# Patient Record
Sex: MALE | Race: WHITE | NOT HISPANIC OR LATINO | Employment: FULL TIME | URBAN - METROPOLITAN AREA
[De-identification: names, ages, dates, MRNs, and addresses within clinical notes are randomized per-mention and may not be internally consistent; named-entity substitution may affect disease eponyms.]

---

## 2022-05-26 ENCOUNTER — OCCUPATIONAL MEDICINE (OUTPATIENT)
Dept: URGENT CARE | Facility: PHYSICIAN GROUP | Age: 32
End: 2022-05-26
Payer: COMMERCIAL

## 2022-05-26 VITALS
HEART RATE: 107 BPM | RESPIRATION RATE: 16 BRPM | DIASTOLIC BLOOD PRESSURE: 70 MMHG | WEIGHT: 205 LBS | TEMPERATURE: 97.8 F | BODY MASS INDEX: 27.77 KG/M2 | HEIGHT: 72 IN | SYSTOLIC BLOOD PRESSURE: 122 MMHG | OXYGEN SATURATION: 100 %

## 2022-05-26 DIAGNOSIS — M25.462 KNEE EFFUSION, LEFT: ICD-10-CM

## 2022-05-26 DIAGNOSIS — S86.911A KNEE STRAIN, RIGHT, INITIAL ENCOUNTER: ICD-10-CM

## 2022-05-26 PROCEDURE — 99203 OFFICE O/P NEW LOW 30 MIN: CPT | Performed by: NURSE PRACTITIONER

## 2022-05-26 NOTE — LETTER
EMPLOYEE’S CLAIM FOR COMPENSATION/ REPORT OF INITIAL TREATMENT  FORM C-4    EMPLOYEE’S CLAIM - PROVIDE ALL INFORMATION REQUESTED   First Name  Forest Last Name  Al Birthdate                    1990                Sex  male Claim Number (Insurer’s Use Only)    Home Address  [address] Age  31 y.o. 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 (if applicable)  Going downstairs under a house    How did this injury or occupational disease occur? (Be specific an answer in detail. Use additional sheet if necessary)  Took a bad step while going downstairs at work and it has progressively gotten worse since. Now it hurts to walk.   If you believe that you have an occupational disease, when did you first have knowledge of the disability and it relationship to your employment?   Witnesses to the Accident  Terrence Reid      Nature of Injury or Occupational Disease  Workers' Compensation  Part(s) of Body Injured or Affected  Knee (R), Knee (R),     I certify that the above is true and correct to the best of my knowledge and that I have provided this information in  order to obtain the benefits of [place]  I hereby authorize any physician, chiropractor, surgeon, practitioner, or other person, any hospital, including [facility] or [facility] hospital, any medical service organization, any insurance company, or other institution or organization to release to each other, any medical or other information, including benefits paid or payable, pertinent to this injury or disease, except information relative to diagnosis, treatment and/or counseling for AIDS, psychological conditions, alcohol or controlled substances, for which I must give specific authorization.  A Photostat of this authorization shall be as valid as the original.     Date   Place Employee’s Original or  *Electronic Signature   THIS REPORT MUST BE COMPLETED AND MAILED WITHIN 3 WORKING DAYS OF TREATMENT   Place  [facility]  Name of Facility  [facility]   Date  5/26/2022 Diagnosis and Description of Injury or Occupational Disease  (M25.462) Knee effusion, left  (S86.911A) Knee strain, right, initial encounter Is there evidence the injured employee was under the influence of alcohol and/or another controlled substance at the time of accident?  ? No ? Yes (if yes, please explain)    Hour  12:10 PM   Diagnoses of Knee effusion, left and Knee strain, right, initial encounter were pertinent to this visit.   Comments:Unable to determine due to the length of time since injury.   Treatment  Work restrictions, knee brace ( hinged), ice packs prn, otc analgesic of choice. Transfer care to occupational medicine. Obtain xray results from [facility].   Have you advised the patient to remain off work five days or     more?    X-Ray Findings    Comments:X-rays were done at [facility] yesterday.  X-rays are not available for review today.  Unknown findings   ? Yes Indicate dates:   From   To     "  From information given by the employee, together with medical evidence, can        you directly connect this injury or occupational disease as job incurred?    Comments:unable to determine ? No If no, is the injured employee capable of:  ? full duty  No ? modified duty  Yes   Is additional medical care by a physician indicated?  Yes If Modified Duty, Specify any Limitations / Restrictions  See [place] D39    Do you know of any previous injury or disease contributing to this condition or occupational disease?  ? Yes ? No (Explain if yes)                          No   Date  5/26/2022 Print Health Care Provider's   CHRISTAL Boles I certify the employer’s copy of  this form was mailed on:   Address  [address] Insurer’s Use Only     [facility] Zip  [address]    Provider’s Tax ID Number  [id number] Telephone  Dept: [phone]             Health Care Provider’s Original or Electronic Signature  n-ApteXCIIGREPOP-DSNNKUGBRE RuddPBIRGIT Degree (MD,DO, DC,PA-C,APRN)   APRN      * Complete and attach Release of Information (Form C-4A) when injured employee signs C-4 Form electronically  ORIGINAL - TREATING HEALTHCARE PROVIDER PAGE 2 - INSURER/TPA PAGE 3 - EMPLOYER PAGE 4 - EMPLOYEE             Form C-4 (rev.08/21)           BRIEF DESCRIPTION OF RIGHTS AND BENEFITS  (Pursuant to [place] 616C.050)    Notice of Injury or Occupational Disease (Incident Report Form C-1): If an injury or occupational disease (OD) arises out of and in the course of employment, you must provide written notice to your employer as soon as practicable, but no later than 7 days after the accident or OD. Your employer shall maintain a sufficient supply of the required forms.    Claim for Compensation (Form C-4): If medical treatment is sought, the form C-4 is available at the place of initial treatment. A completed \"Claim for Compensation\" (Form C-4) must be filed within 90 days after an accident or OD. The treating " physician or chiropractor must, within 3 working days after treatment, complete and mail to the employer, the employer's insurer and third-party , the Claim for Compensation.    Medical Treatment: If you require medical treatment for your on-the-job injury or OD, you may be required to select a physician or chiropractor from a list provided by your workers’ compensation insurer, if it has contracted with an Organization for Managed Care (MCO) or Preferred Provider Organization (PPO) or providers of health care. If your employer has not entered into a contract with an MCO or PPO, you may select a physician or chiropractor from the Panel of Physicians and Chiropractors. Any medical costs related to your industrial injury or OD will be paid by your insurer.    Temporary Total Disability (TTD): If your doctor has certified that you are unable to work for a period of at least 5 consecutive days, or 5 cumulative days in a 20-day period, or places restrictions on you that your employer does not accommodate, you may be entitled to TTD compensation.    Temporary Partial Disability (TPD): If the wage you receive upon reemployment is less than the compensation for TTD to which you are entitled, the insurer may be required to pay you TPD compensation to make up the difference. TPD can only be paid for a maximum of 24 months.    Permanent Partial Disability (PPD): When your medical condition is stable and there is an indication of a PPD as a result of your injury or OD, within 30 days, your insurer must arrange for an evaluation by a rating physician or chiropractor to determine the degree of your PPD. The amount of your PPD award depends on the date of injury, the results of the PPD evaluation, your age and wage.    Permanent Total Disability (PTD): If you are medically certified by a treating physician or chiropractor as permanently and totally disabled and have been granted a PTD status by your insurer, you are  entitled to receive monthly benefits not to exceed 66 2/3% of your average monthly wage. The amount of your PTD payments is subject to reduction if you previously received a lump-sum PPD award.    Vocational Rehabilitation Services: You may be eligible for vocational rehabilitation services if you are unable to return to the job due to a permanent physical impairment or permanent restrictions as a result of your injury or occupational disease.    Transportation and Per Tristin Reimbursement: You may be eligible for travel expenses and per tristin associated with medical treatment.    Reopening: You may be able to reopen your claim if your condition worsens after claim closure.     Appeal Process: If you disagree with a written determination issued by the insurer or the insurer does not respond to your request, you may appeal to the Department of Administration, , by following the instructions contained in your determination letter. You must appeal the determination within 70 days from the date of the determination letter at [address], [address], [place], [place] [address], or [address], [address], [place] [address]. If you disagree with the  decision, you may appeal to the Department of Administration, . You must file your appeal within 30 days from the date of the  decision letter at [address], [address], [place], [place] [address], or [address], [address], [place] [address]. If you disagree with a decision of an , you may file a petition for judicial review with the District Court. You must do so within 30 days of the Appeal Officer’s decision. You may be represented by an  at your own expense or you may contact the [facility] for possible representation.    [place]  for Injured Workers (NAIW): If you disagree with a  decision, you may request that NAIW represent  you without charge at an  Hearing. For information regarding denial of benefits, you may contact the [facility] at: [address], [address], [place] [address], [phone], or [address] [facility], [address], [place] [address], [phone]    To File a Complaint with the Division: If you wish to file a complaint with the  of the Division of Industrial Relations (DIR),  please contact the Workers’ Compensation Section, [address], [address], [place], [place] [address], telephone ([phone]) [phone], or [address], [address], [place], [place] [address], telephone ([phone]) [phone].    For assistance with Workers’ Compensation Issues: You may contact the [facility] Office for Consumer Health Assistance, [address], [address], [place], [place] [address], Toll Free 1-958.749.9185, Web site: http://[facility].nv.gov/Programs/LGENIS E-mail: glenis@[facility].nv.[facility]              __________________________________________________________________                                    _________________            Employee Name / Signature                                                                                                                            Date                                                                                                                                                                                                                              D-2 (rev. 10/20)

## 2022-05-26 NOTE — LETTER
[facility]  [address]  [place] [address]  Phone:  [phone] - Fax:  [phone]   Occ[facility]ational [facility] Progress Report and Disability Certification  Date of Service: 5/26/2022   No Show:  No  Date / Time of Next Visit: 6/3/2022 Transfer care to Occupational Medicine at [facility] office and sports medicine ( [facility])    Claim Information   Patient Name: Forest Melendez  Claim Number:     Employer: [employer] *** Date of Injury: 5/19/2022     Insurer / TPA:   *** ID / SSN:     Occupation: Construction *** Diagnosis: Diagnoses of Knee effusion, left and Knee strain, right, initial encounter were pertinent to this visit.    Medical Information   Related to Industrial Injury? Yes ***   Subjective Complaints:  DOI: 05/19/22. Notified his employer on 05/23/22.   REGIS: he was going downstairs under a house and took a bad step. Felt pain in right knee. He continued to work since that time. Pain slowly but progressively has gotten worse. Painful to walk. Pain 5/10 right  now. At night pain is 10/10.   Treatment tried: ice, heat and Ibuprofen ( 400 mg TID) .   He was seen yesterday at [facility] urgent care for this injury. He used his own insurance yesterday because he wanted to be seen yesterday and he was told that they did not do workers compensation healthcare. He had xrays yesterday of his  right knee. He was told to use ice and anti-inflammatories. Was told that his xray was normal -- then  today he received a phone call and was told that he needed to go to orthopedic and have MRI due to effusion in his knee that was later seen on xray. A referral was placed but he could not be seen there today due to workers compensation injury.   No other employers. No outside work sport activities. Does not have stairs at home that he needs to climb. Denies previous injury to knee.    Objective Findings: Physical Exam  Vitals and nursing note reviewed.   Constitutional:        Appearance: He is well-developed.   Cardiovascular:      Rate and Rhythm: Normal rate.      Pulses: Normal pulses.   Pulmonary:      Effort: Pulmonary effort is normal.   Musculoskeletal:      Right knee: Swelling and effusion present. No erythema, lacerations or crepitus. Decreased range of motion. Tenderness present over the MCL. Normal alignment.      Instability Tests: Anterior drawer test negative. Posterior drawer test negative.      Comments: Right knee assessment limited by swelling and generalized tightness in joint.   Pain increased with flexion and extension    Skin:     General: Skin is warm and dry.   Neurological:      Mental Status: He is alert and oriented to person, place, and time.      Cranial Nerves: No cranial nerve deficit.      Coordination: Coordination normal.      Gait: Gait abnormal.      Deep Tendon Reflexes: Reflexes are normal and symmetric.   Psychiatric:         Speech: Speech normal.        Pre-Existing Condition(s):     Assessment:   Initial Visit    Status: Discharged / Care Transfer  Comments:Transfer care to occupational medicine.  Permanent Disability:No    Plan: Medication    Diagnostics:      Comments:       Disability Information   Status: Released to Restricted Duty    From:  5/26/2022  Through: 6/3/2022 Restrictions are: Temporary   Physical Restrictions   Sitting:    Standing:    Stooping:    Bending:      Squatting:    Walking:    Climbing:    Pushing:      Pulling:    Other:    Reaching Above Shoulder (L):   Reaching Above Shoulder (R):       Reaching Below Shoulder (L):    Reaching Below Shoulder (R):      Not to exceed Weight Limits   Carrying(hrs):   Weight Limit(lb):   Lifting(hrs):   Weight  Limit(lb):     Comments:      Repetitive Actions   Hands: i.e. Fine Manipulations from Grasping:     Feet: i.e. Operating Foot Controls:     Driving / Operate Machinery:     Health Care Provider’s Original or Electronic Signature  CHRISTAL Boles [facility]  Provider’s Original or Electronic Signature    Forest Allen MD         Clinic Name / Location: [address] [place] [address] Clinic Phone Number: Dept: [phone]   Appointment Time: 11:15 Am Visit Start Time: 12:10 PM   Check-In Time:  11:35 Am Visit Discharge Time:  ***   Original-Treating Physician or Chiropractor    Page 2-Insurer/TPA    Page 3-Employer    Page 4-Employee

## 2022-05-26 NOTE — PROGRESS NOTES
Subjective     Forest Melendez is a 31 y.o. male who presents with Knee Injury (Took a wrong step and caugh himself, x7days. )    Reviewed past medical, surgical and family history. Reviewed prescription and OTC medications with patient in electronic health record today  Allergies: Patient has no known allergies.          DOI: 05/19/22. Notified his employer on 05/23/22.   REIGS: he was going downstairs under a house and took a bad step. Felt pain in right knee. He continued to work. Pain slowly but progressively got worse. Painful to walk. Pain 5/10 right  now. At night pain is 10/10.   Treatment tried: ice, heat and Ibuprofen ( 400 mg TID) .   He was seen yesterday at [facility] for this injury. He used his own insurance yesterday because he wanted to be seen and they did not do workers compensation healthcare. They did xrays yesterday of his knee. He was told to use ice and anti-inflammatories. Was told that his xray was normal then was called today and told that he needed to go to orthopedic due to effusion in his knee.      HPI    ROS           Objective     /70   Pulse (!) 107   Temp 36.6 °C (97.8 °F) (Tympanic)   Resp 16   Ht 1.829 m (6')   Wt 93 kg (205 lb)   SpO2 100%   BMI 27.80 kg/m²      Physical Exam  Vitals and nursing note reviewed.   Constitutional:       Appearance: He is well-developed.   Cardiovascular:      Rate and Rhythm: Normal rate.      Pulses: Normal pulses.   Pulmonary:      Effort: Pulmonary effort is normal.   Musculoskeletal:      Right knee: Swelling and effusion present. No erythema, lacerations or crepitus. Decreased range of motion. Tenderness present over the MCL. Normal alignment.      Instability Tests: Anterior drawer test negative. Posterior drawer test negative.      Comments: Right knee assessment limited by swelling and generalized tightness in joint.   Pain increased with flexion and extension    Skin:     General: Skin is warm and dry.    Neurological:      Mental Status: He is alert and oriented to person, place, and time.      Cranial Nerves: No cranial nerve deficit.      Coordination: Coordination normal.      Gait: Gait abnormal.      Deep Tendon Reflexes: Reflexes are normal and symmetric.   Psychiatric:         Speech: Speech normal.                             Assessment & Plan        1. Knee effusion, left  Referral to Occupational Medicine    Referral to Sports Medicine   2. Knee strain, right, initial encounter  Referral to Occupational Medicine    Referral to Sports Medicine       See [place] D39 and C4               I have spent  30 minutes on the care of this patient.  This includes preparing for visit which includes review of previous visits if available in EMR, obtaining HPI, exam and evaluation of patient, ordering and independent interpretation of labs, imaging, tests, medical management, counseling, education and documentation.

## 2022-05-26 NOTE — LETTER
[facility]  [address] [facility] [address] [facility] [place] [address]  Phone:  [phone] - Fax:  [phone]   Occupational Health Network Progress Report and Disability Certification  Date of Service: 5/26/2022   No Show:  No  Date / Time of Next Visit: 6/3/2022   Claim Information   Patient Name: Forest Melendez  Claim Number:     Employer: [employer]  Date of Injury: 5/19/2022     Insurer / TPA:    ID / SSN:     Occupation: Construction  Diagnosis: Diagnoses of Knee effusion, left and Knee strain, right, initial encounter were pertinent to this visit.    Medical Information   Related to Industrial Injury? Yes    Subjective Complaints:  DOI: 05/19/22. Notified his employer on 05/23/22.   REGIS: he was going downstairs under a house and took a bad step. Felt pain in right knee. He continued to work since that time. Pain slowly but progressively has gotten worse. Painful to walk. Pain 5/10 right  now. At night pain is 10/10.   Treatment tried: ice, heat and Ibuprofen ( 400 mg TID) .   He was seen yesterday at [facility] urgent care for this injury. He used his own insurance yesterday because he wanted to be seen yesterday and he was told that they did not do workers compensation healthcare. He had xrays yesterday of his  right knee. He was told to use ice and anti-inflammatories. Was told that his xray was normal -- then  today he received a phone call and was told that he needed to go to orthopedic and have MRI due to effusion in his knee that was later seen on xray. A referral was placed but he could not be seen there today due to workers compensation injury.   No other employers. No outside work sport activities. Does not have stairs at home that he needs to climb. Denies previous injury to knee.    Objective Findings: Physical Exam  Vitals and nursing note reviewed.   Constitutional:       Appearance: He is well-developed.   Cardiovascular:      Rate and Rhythm: Normal rate.      Pulses:  Normal pulses.   Pulmonary:      Effort: Pulmonary effort is normal.   Musculoskeletal:      Right knee: Swelling and effusion present. No erythema, lacerations or crepitus. Decreased range of motion. Tenderness present over the MCL. Normal alignment.      Instability Tests: Anterior drawer test negative. Posterior drawer test negative.      Comments: Right knee assessment limited by swelling and generalized tightness in joint.   Pain increased with flexion and extension    Skin:     General: Skin is warm and dry.   Neurological:      Mental Status: He is alert and oriented to person, place, and time.      Cranial Nerves: No cranial nerve deficit.      Coordination: Coordination normal.      Gait: Gait abnormal.      Deep Tendon Reflexes: Reflexes are normal and symmetric.   Psychiatric:         Speech: Speech normal.        Pre-Existing Condition(s): Denies   Assessment:   Initial Visit    Status: Discharged / Care Transfer  Comments:Transfer care to occupational medicine.  Permanent Disability:No    Plan: Medication    Diagnostics:   Comments:Were done yesterday at [facility].  X-rays are unavailable today for review.    Comments:       Disability Information   Status: Released to Restricted Duty    From:  2022  Through: 6/3/2022 Restrictions are: Temporary   Physical Restrictions   Sitting:    Standing:  < or = to 2 hrs/day Stoopin hrs/day Bending:      Squattin hrs/day Walking:  < or = to 2 hrs/day Climbin hrs/day Pushing:      Pulling:    Other:    Reaching Above Shoulder (L):   Reaching Above Shoulder (R):       Reaching Below Shoulder (L):    Reaching Below Shoulder (R):      Not to exceed Weight Limits   Carrying(hrs):   Weight Limit(lb):   Lifting(hrs):   Weight  Limit(lb):     Comments: Hinged knee brace while at work and when walking.  He may remove it to drive since it is his right knee.    Repetitive Actions   Hands: i.e. Fine Manipulations from Grasping:     Feet:  i.e. Operating Foot Controls: < or = to 2 hrs/day   Driving / Operate Machinery: 0 hrs/day   Health Care Provider’s Original or Electronic Signature  CHRISTAL Boles Health Care Provider’s Original or Electronic Signature    Forest Allen MD         Clinic Name / Location: [address] [address] Clinic Phone Number: Dept: [phone]   Appointment Time: 11:15 Am Visit Start Time: 12:10 PM   Check-In Time:  11:35 Am Visit Discharge Time:  1:10 PM   Original-Treating Physician or Chiropractor    Page 2-Insurer/TPA    Page 3-Employer    Page 4-Employee

## 2022-06-03 ENCOUNTER — OCCUPATIONAL MEDICINE (OUTPATIENT)
Dept: OCCUPATIONAL MEDICINE | Facility: CLINIC | Age: 32
End: 2022-06-03
Payer: COMMERCIAL

## 2022-06-03 VITALS
OXYGEN SATURATION: 94 % | DIASTOLIC BLOOD PRESSURE: 100 MMHG | WEIGHT: 208 LBS | HEIGHT: 72 IN | BODY MASS INDEX: 28.17 KG/M2 | TEMPERATURE: 97.2 F | SYSTOLIC BLOOD PRESSURE: 140 MMHG | HEART RATE: 139 BPM

## 2022-06-03 DIAGNOSIS — S83.91XD SPRAIN OF RIGHT KNEE, UNSPECIFIED LIGAMENT, SUBSEQUENT ENCOUNTER: ICD-10-CM

## 2022-06-03 PROCEDURE — 99204 OFFICE O/P NEW MOD 45 MIN: CPT | Performed by: PREVENTIVE MEDICINE

## 2022-06-03 NOTE — LETTER
[facility]  [address],   [address] [place] [address]  Phone:  [phone] - Fax:  [phone]   Occupational Health [facility] Progress Report and Disability Certification  Date of Service: 6/3/2022   No Show:  No  Date / Time of Next Visit: 6/24/2022 @ 10:45AM   Claim Information   Patient Name: Forest Melendez  Claim Number:     Employer:   [employer] Date of Injury: 5/19/2022     Insurer / TPA: [employer]  ID / SSN:     Occupation: Construction  Diagnosis: There were no encounter diagnoses.    Medical Information   Related to Industrial Injury? 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 REGIS: he was going downstairs under a house and took a bad step. Felt pain in right knee.  Patient states that pain was mild at first and worsened over few days.  The knee does start to get swollen as well.  He sought care at [facility], he try to go to orthopedic clinic but he was told to file work comp claim.  Then presented to [facility] and was referred over here.  Patient states that pain is most on the medial lateral aspects of the knee.  He does get some instability with walking especially without the knee brace.  Taking Tylenol, elevating and icing knee as able.  He states that there were 3 to 4 days where the knee was very swollen but that is since calmed down.  Patient working light duty.  Denies prior right knee injuries.   Objective Findings: Right knee: Mild to moderate diffuse swelling of the knee.  Mild tenderness over the medial and lateral joint lines.  Anterior/posterior drawer test negative.  No laxity with varus or valgus stress but does elicit some pain.  Paloma's positive.  Able to squat fully with mild discomfort.  Slight antalgic gait.   Pre-Existing Condition(s):     Assessment:   Condition Same    Status: Additional Care Required  Permanent Disability:No    Plan:      Diagnostics:       Comments:  Some concern for possible meniscus injury, placed referral for MRI right knee  Placed referral for physical therapy  OTC ibuprofen/Tylenol as needed  Elevate and ice knee as needed  Continue knee brace for prolonged walking or going up and down stairs  Restricted duty  Follow-up 3 weeks, sooner if MRI performed sooner    Disability Information   Status: Released to Restricted Duty    From:  6/3/2022  Through: 6/24/2022 Restrictions are: Temporary   Physical Restrictions   Sitting:    Standing:  < or = to 2 hrs/day Stooping:    Bending:      Squatting:  < or = to 1 hr/day Walking:  < or = to 2 hrs/day Climbing:  < or = to 1 hr/day Pushing:      Pulling:    Other:    Reaching Above Shoulder (L):   Reaching Above Shoulder (R):       Reaching Below Shoulder (L):    Reaching Below Shoulder (R):      Not to exceed Weight Limits   Carrying(hrs):   Weight Limit(lb): < or = to 25 pounds Lifting(hrs):   Weight  Limit(lb): < or = to 25 pounds   Comments:      Repetitive Actions   Hands: i.e. Fine Manipulations from Grasping:     Feet: i.e. Operating Foot Controls:     Driving / Operate Machinery:     Health Care Provider’s Original or Electronic Signature  Jono Corona D.O. Health Care Provider’s Original or Electronic Signature    Forest Allen MD         Clinic Name / Location: [address] [place] [address] Clinic Phone Number: Dept: [phone]   Appointment Time: 1:45 Pm Visit Start Time: 1:32 PM   Check-In Time:  1:29 Pm Visit Discharge Time:  2:07PM   Original-Treating Physician or Chiropractor    Page 2-Insurer/TPA    Page 3-Employer    Page 4-Employee

## 2022-06-03 NOTE — PROGRESS NOTES
Subjective:     Forest Melendez is a 31 y.o. 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 REGIS: he was going downstairs under a house and took a bad step. Felt pain in right knee.  Patient states that pain was mild at first and worsened over few days.  The knee does start to get swollen as well.  He sought care at [place] [facility], he try to go to orthopedic clinic but he was told to file work comp claim.  Then presented to [facility] and was referred over here.  Patient states that pain is most on the medial lateral aspects of the knee.  He does get some instability with walking especially without the knee brace.  Taking Tylenol, elevating and icing knee as able.  He states that there were 3 to 4 days where the knee was very swollen but that is since calmed down.  Patient working light duty.  Denies prior right knee injuries.    ROS: All systems were reviewed on intake form, form was reviewed and signed. See scanned documents in media. Pertinent positives and negatives included in HPI.    PMH: No pertinent past medical history to this problem  MEDS: Medications were reviewed in Epic  ALLERGIES:   Allergies   Allergen Reactions   • Bee      anaphalactic       SOCHX: Works as a supervisor at [employer]  FH: No pertinent family history to this problem       Objective:     BP (!) 140/100   Pulse (!) 139   Temp 36.2 °C (97.2 °F)   Ht 1.829 m (6')   Wt 94.3 kg (208 lb)   SpO2 94%   BMI 28.21 kg/m²     Constitutional: Patient is in no acute distress. Appears well-developed and well-nourished.   HENT: Normocephalic and atraumatic. EOM are normal. No scleral icterus.   Cardiovascular: Normal rate.    Pulmonary/Chest: Effort normal. No respiratory distress.   Neurological: Patient is alert and oriented to person, place, and time.   Skin: Skin is warm and dry.   Psychiatric: Normal mood and affect. Behavior is  normal.     Right knee: Mild to moderate diffuse swelling of the knee.  Mild tenderness over the medial and lateral joint lines.  Anterior/posterior drawer test negative.  No laxity with varus or valgus stress but does elicit some pain.  Paloma's positive.  Able to squat fully with mild discomfort.  Slight antalgic gait.    Assessment/Plan:       1. Sprain of right knee, unspecified ligament, subsequent encounter  - Referral to Radiology  - MR-KNEE-W/O RIGHT; Future  - Referral to Physical Therapy    Released to Restricted Duty FROM 6/3/2022 TO 6/24/2022     Some concern for possible meniscus injury, placed referral for MRI right knee  Placed referral for physical therapy  OTC ibuprofen/Tylenol as needed  Elevate and ice knee as needed  Continue knee brace for prolonged walking or going up and down stairs  Restricted duty  Follow-up 3 weeks, sooner if MRI performed sooner    Differential diagnosis, natural history, supportive care, and indications for immediate follow-up discussed.    Approximately 45 minutes were spent in reviewing notes, preparing for visit, obtaining history, exam and evaluation, patient counseling/education and post visit documentation/orders.

## 2022-06-24 ENCOUNTER — OCCUPATIONAL MEDICINE (OUTPATIENT)
Dept: OCCUPATIONAL MEDICINE | Facility: CLINIC | Age: 32
End: 2022-06-24
Payer: COMMERCIAL

## 2022-06-24 VITALS
RESPIRATION RATE: 14 BRPM | SYSTOLIC BLOOD PRESSURE: 120 MMHG | BODY MASS INDEX: 28.17 KG/M2 | WEIGHT: 208 LBS | HEIGHT: 72 IN | HEART RATE: 104 BPM | OXYGEN SATURATION: 98 % | TEMPERATURE: 98.4 F | DIASTOLIC BLOOD PRESSURE: 80 MMHG

## 2022-06-24 DIAGNOSIS — S83.91XD SPRAIN OF RIGHT KNEE, UNSPECIFIED LIGAMENT, SUBSEQUENT ENCOUNTER: ICD-10-CM

## 2022-06-24 PROCEDURE — 99213 OFFICE O/P EST LOW 20 MIN: CPT | Performed by: PREVENTIVE MEDICINE

## 2022-06-24 ASSESSMENT — ENCOUNTER SYMPTOMS
SENSORY CHANGE: 0
TINGLING: 0

## 2022-06-24 NOTE — LETTER
[address],   [address] [place] [address]  Phone:  [phone] - Fax:  [phone]   Occupational Health [facility] Progress Report and Disability Certification  Date of Service: 6/24/2022   No Show:  No  Date / Time of Next Visit: 7/15/2022 1030am   Claim Information   Patient Name: Forest Melendez  Claim Number:     Employer:   [employer] Date of Injury: 5/19/2022     Insurer / TPA:   [employer] ID / SSN:     Occupation: Construction  Diagnosis: The encounter diagnosis was Sprain of right knee, unspecified ligament, subsequent encounter.    Medical Information   Related to Industrial Injury? 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 REGIS: he was going downstairs under a house and took a bad step. Felt pain in right knee.  Patient states that pain was mild at first and worsened over few days.  Patient states that pain is mostly unchanged from last visit.  Pain mostly on the sides.  He is able to work light duty still.  He states he has not received any paperwork or phone calls from the work comp insurance.  He is unsure of the status of his claim.   Objective Findings: Right knee: Mild  diffuse swelling of the knee.  Area of pain over the medial and lateral knee.  Slight antalgic gait.   Pre-Existing Condition(s):     Assessment:   Condition Same    Status: Additional Care Required  Permanent Disability:No    Plan:      Diagnostics:      Comments:  Referral for MRI right knee, pending approval  Referral for physical therapy, pending approval  OTC ibuprofen/Tylenol as needed  Elevate and ice knee as needed   recommend trying to contact work comp insurance or have employer also trying contact work comp insurance  Continue knee brace for prolonged walking or going up and down stairs  Restricted duty  Follow-up 3 weeks, sooner if MRI performed sooner    Disability Information   Status: Released to Restricted Duty     From:  6/24/2022  Through: 7/15/2022 Restrictions are: Temporary   Physical Restrictions   Sitting:    Standing:  < or = to 2 hrs/day Stooping:    Bending:      Squatting:  < or = to 1 hr/day Walking:  < or = to 2 hrs/day Climbing:  < or = to 1 hr/day Pushing:      Pulling:    Other:    Reaching Above Shoulder (L):   Reaching Above Shoulder (R):       Reaching Below Shoulder (L):    Reaching Below Shoulder (R):      Not to exceed Weight Limits   Carrying(hrs):   Weight Limit(lb): < or = to 25 pounds Lifting(hrs):   Weight  Limit(lb): < or = to 25 pounds   Comments:      Repetitive Actions   Hands: i.e. Fine Manipulations from Grasping:     Feet: i.e. Operating Foot Controls:     Driving / Operate Machinery:     Health Care Provider’s Original or Electronic Signature  Jono Corona D.O. Health Care Provider’s Original or Electronic Signature    Forest Allen MD         Clinic Name / Location: [address] [address] Clinic Phone Number: Dept: [phone]   Appointment Time: 10:45 Am Visit Start Time: 10:39 AM   Check-In Time:  10:37 Am Visit Discharge Time:  1056am   Original-Treating Physician or Chiropractor    Page 2-Insurer/TPA    Page 3-Employer    Page 4-Employee

## 2022-06-24 NOTE — PROGRESS NOTES
Subjective:     Forest Melendez is a 31 y.o. 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 REGIS: he was going downstairs under a house and took a bad step. Felt pain in right knee.  Patient states that pain was mild at first and worsened over few days.  Patient states that pain is mostly unchanged from last visit.  Pain mostly on the sides.  He is able to work light duty still.  He states he has not received any paperwork or phone calls from the work comp insurance.  He is unsure of the status of his claim.    Review of Systems   Neurological: Negative for tingling and sensory change.       SOCHX: Works as a  at [employer]  FH: No pertinent family history to this problem.       Objective:     /80   Pulse (!) 104   Temp 36.9 °C (98.4 °F) (Temporal)   Resp 14   Ht 1.829 m (6')   Wt 94.3 kg (208 lb)   SpO2 98%   BMI 28.21 kg/m²     Constitutional: Patient is in no acute distress. Appears well-developed and well-nourished.   Cardiovascular: Normal rate.    Pulmonary/Chest: Effort normal. No respiratory distress.   Neurological: Patient is alert and oriented to person, place, and time.   Skin: Skin is warm and dry.   Psychiatric: Normal mood and affect. Behavior is normal.     Right knee: Mild  diffuse swelling of the knee.  Area of pain over the medial and lateral knee.  Slight antalgic gait.    Assessment/Plan:       1. Sprain of right knee, unspecified ligament, subsequent encounter    Released to Restricted Duty FROM 6/24/2022 TO 7/15/2022       Referral for MRI right knee, pending approval  Referral for physical therapy, pending approval  OTC ibuprofen/Tylenol as needed  Elevate and ice knee as needed   recommend trying to contact work comp insurance or have employer also trying contact work comp insurance  Continue knee brace for prolonged walking or going up and  down stairs  Restricted duty  Follow-up 3 weeks, sooner if MRI performed sooner    Differential diagnosis, natural history, supportive care, and indications for immediate follow-up discussed.    Approximately 25 minutes was spent in preparing for visit, obtaining history, exam and evaluation, patient counseling/education and post visit documentation/orders.

## 2022-07-15 ENCOUNTER — OCCUPATIONAL MEDICINE (OUTPATIENT)
Dept: OCCUPATIONAL MEDICINE | Facility: CLINIC | Age: 32
End: 2022-07-15
Payer: COMMERCIAL

## 2022-07-15 VITALS
DIASTOLIC BLOOD PRESSURE: 74 MMHG | HEIGHT: 73 IN | RESPIRATION RATE: 16 BRPM | WEIGHT: 201.2 LBS | BODY MASS INDEX: 26.66 KG/M2 | OXYGEN SATURATION: 97 % | SYSTOLIC BLOOD PRESSURE: 132 MMHG | HEART RATE: 115 BPM | TEMPERATURE: 98.7 F

## 2022-07-15 DIAGNOSIS — S83.91XD SPRAIN OF RIGHT KNEE, UNSPECIFIED LIGAMENT, SUBSEQUENT ENCOUNTER: ICD-10-CM

## 2022-07-15 PROCEDURE — 99213 OFFICE O/P EST LOW 20 MIN: CPT | Performed by: PREVENTIVE MEDICINE

## 2022-07-15 NOTE — PROGRESS NOTES
"Subjective:     Forest Melendez is a 31 y.o. 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 REGIS: he was going downstairs under a house and took a bad step. Felt pain in right knee.  Patient states overall he feels that the knee pain has been worsening over the last few weeks.  Has more pain on the lateral and medial aspect of the knee.  Patient states pain especially worse with going up and down stairs.  He states a few days ago he was able to talk with his case  and received a letter from his case  asking for records and referrals that we have done thus far.    ROS  SOCHX: Works as a  at [employer]  FH: No pertinent family history to this problem.         Objective:     /74 (BP Location: Right arm, Patient Position: Sitting, BP Cuff Size: Adult)   Pulse (!) 115   Temp 37.1 °C (98.7 °F) (Temporal)   Resp 16   Ht 1.854 m (6' 1\")   Wt 91.3 kg (201 lb 3.2 oz)   SpO2 97%   BMI 26.55 kg/m²     Constitutional: Patient is in no acute distress. Appears well-developed and well-nourished.   Cardiovascular: Normal rate.    Pulmonary/Chest: Effort normal. No respiratory distress.   Neurological: Patient is alert and oriented to person, place, and time.   Skin: Skin is warm and dry.   Psychiatric: Normal mood and affect. Behavior is normal.     Right knee: No gross deformity.  Area of pain over the medial and lateral joint lines.  Slight antalgic gait.  Drawer test negative.    Assessment/Plan:       1. Sprain of right knee, unspecified ligament, subsequent encounter  - Referral to Orthopedics    Released to Restricted Duty FROM 7/15/2022 TO 8/16/2022       Given duration of symptoms placed new referral to orthopedics  Referral for MRI right knee, pending approval  Referral for physical therapy, pending approval  We will resubmit all referrals to case   OTC ibuprofen/Tylenol as " needed  Elevate and ice knee as needed   Continue knee brace for prolonged walking or going up and down stairs  Restricted duty  Follow-up 4 weeks if not seen by orthopedics    Differential diagnosis, natural history, supportive care, and indications for immediate follow-up discussed.    Approximately 25 minutes was spent in preparing for visit, obtaining history, exam and evaluation, patient counseling/education and post visit documentation/orders.

## 2022-07-15 NOTE — LETTER
[address],   [address] [place] [address]  Phone:  [phone] - Fax:  [phone]   Occupational Health [facility] Progress Report and Disability Certification  Date of Service: 7/15/2022   No Show:  No  Date / Time of Next Visit: 8/16/2022 @ 7:45am   Claim Information   Patient Name: Forest Melednez  Claim Number:     Employer:   [employer] Date of Injury: 5/19/2022     Insurer / TPA: [employer]  ID / SSN:     Occupation: Construction  Diagnosis: The encounter diagnosis was Sprain of right knee, unspecified ligament, subsequent encounter.    Medical Information   Related to Industrial Injury? 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 REGIS: he was going downstairs under a house and took a bad step. Felt pain in right knee.  Patient states overall he feels that the knee pain has been worsening over the last few weeks.  Has more pain on the lateral and medial aspect of the knee.  Patient states pain especially worse with going up and down stairs.  He states a few days ago he was able to talk with his case  and received a letter from his case  asking for records and referrals that we have done thus far.   Objective Findings: Right knee: No gross deformity.  Area of pain over the medial and lateral joint lines.  Slight antalgic gait.  Drawer test negative.   Pre-Existing Condition(s):     Assessment:   Condition Same    Status: Additional Care Required  Permanent Disability:No    Plan:      Diagnostics:      Comments:  Given duration of symptoms placed new referral to orthopedics  Referral for MRI right knee, pending approval  Referral for physical therapy, pending approval  We will resubmit all referrals to case   OTC ibuprofen/Tylenol as needed  Elevate and ice knee as needed   Continue knee brace for prolonged walking or going up and down stairs  Restricted  duty  Follow-up 4 weeks if not seen by orthopedics    Disability Information   Status: Released to Restricted Duty    From:  7/15/2022  Through: 8/16/2022 Restrictions are: Temporary   Physical Restrictions   Sitting:    Standing:  < or = to 2 hrs/day Stooping:    Bending:      Squatting:  < or = to 1 hr/day Walking:  < or = to 2 hrs/day Climbing:  < or = to 1 hr/day Pushing:      Pulling:    Other:    Reaching Above Shoulder (L):   Reaching Above Shoulder (R):       Reaching Below Shoulder (L):    Reaching Below Shoulder (R):      Not to exceed Weight Limits   Carrying(hrs):   Weight Limit(lb): < or = to 25 pounds Lifting(hrs):   Weight  Limit(lb): < or = to 25 pounds   Comments:      Repetitive Actions   Hands: i.e. Fine Manipulations from Grasping:     Feet: i.e. Operating Foot Controls:     Driving / Operate Machinery:     Health Care Provider’s Original or Electronic Signature  Jono Corona D.O. Health Care Provider’s Original or Electronic Signature    Forest Allen MD         Clinic Name / Location: [address],   [address] [address] Clinic Phone Number: Dept: [phone]   Appointment Time: 10:30 Am Visit Start Time: 10:51 AM   Check-In Time:  10:44 Am Visit Discharge Time:  11:15am   Original-Treating Physician or Chiropractor    Page 2-Insurer/TPA    Page 3-Employer    Page 4-Employee

## 2022-08-16 ENCOUNTER — OCCUPATIONAL MEDICINE (OUTPATIENT)
Dept: OCCUPATIONAL MEDICINE | Facility: CLINIC | Age: 32
End: 2022-08-16
Payer: COMMERCIAL

## 2022-08-16 VITALS
BODY MASS INDEX: 27.55 KG/M2 | TEMPERATURE: 99.3 F | DIASTOLIC BLOOD PRESSURE: 62 MMHG | HEIGHT: 72 IN | HEART RATE: 102 BPM | RESPIRATION RATE: 16 BRPM | OXYGEN SATURATION: 98 % | SYSTOLIC BLOOD PRESSURE: 128 MMHG | WEIGHT: 203.4 LBS

## 2022-08-16 DIAGNOSIS — S83.91XD SPRAIN OF RIGHT KNEE, UNSPECIFIED LIGAMENT, SUBSEQUENT ENCOUNTER: ICD-10-CM

## 2022-08-16 PROCEDURE — 99213 OFFICE O/P EST LOW 20 MIN: CPT | Performed by: PREVENTIVE MEDICINE

## 2022-08-16 NOTE — LETTER
[address],   [address] [place] [address]  Phone:  [phone] - Fax:  [phone]   Occupational Health [facility] Progress Report and Disability Certification  Date of Service: 8/16/2022   No Show:  No  Date / Time of Next Visit: 9/15/2022 @745am   Claim Information   Patient Name: Forest Melendez  Claim Number:     Employer:   [employer] Date of Injury: 5/19/2022     Insurer / TPA: [employer]  ID / SSN:     Occupation: Construction  Diagnosis: The encounter diagnosis was Sprain of right knee, unspecified ligament, subsequent encounter.    Medical Information   Related to Industrial Injury? 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 REGIS: he was going downstairs under a house and took a bad step. Felt pain in right knee.  Patient states overall knee pain is about the same.  Has not had any change in symptoms.  He states he has been trying to contact work comp insurance has not had any success.  Has not heard anything about PT, MRI or orthopedic consult being approved.   Objective Findings: Right knee: No gross deformity.  Area of pain over the medial and lateral joint lines.  Slight antalgic gait.    Pre-Existing Condition(s):     Assessment:   Condition Same    Status: Additional Care Required  Permanent Disability:No    Plan:      Diagnostics:      Comments:  Referral to orthopedics, pending approval for the past month  Referral for MRI right knee, pending approval for the past 2 and half months  Referral for physical therapy, pending approval for the past 2 and half months  OTC ibuprofen/Tylenol as needed  Elevate and ice knee as needed   Continue knee brace for prolonged walking or going up and down stairs  Restricted duty  Follow-up 4 weeks if not seen by orthopedics    Disability Information   Status: Released to Restricted Duty    From:  8/16/2022  Through: 9/15/2022 Restrictions are:      Physical Restrictions   Sitting:    Standing:  < or = to 2 hrs/day Stooping:    Bending:      Squatting:  < or = to 1 hr/day Walking:  < or = to 2 hrs/day Climbing:  < or = to 1 hr/day Pushing:      Pulling:    Other:    Reaching Above Shoulder (L):   Reaching Above Shoulder (R):       Reaching Below Shoulder (L):    Reaching Below Shoulder (R):      Not to exceed Weight Limits   Carrying(hrs):   Weight Limit(lb): < or = to 25 pounds Lifting(hrs):   Weight  Limit(lb): < or = to 25 pounds   Comments:      Repetitive Actions   Hands: i.e. Fine Manipulations from Grasping:     Feet: i.e. Operating Foot Controls:     Driving / Operate Machinery:     Health Care Provider’s Original or Electronic Signature  Jono Corona D.O. Health Care Provider’s Original or Electronic Signature    Forest Allen MD         Clinic Name / Location: [address] [address] Clinic Phone Number: Dept: [phone]   Appointment Time: 7:45 Am Visit Start Time: 8:18 AM   Check-In Time:  8:13 Am Visit Discharge Time:  830am   Original-Treating Physician or Chiropractor    Page 2-Insurer/TPA    Page 3-Employer    Page 4-Employee

## 2022-08-16 NOTE — PROGRESS NOTES
Subjective:     Forest Melendez is a 32 y.o. 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 REGIS: he was going downstairs under a house and took a bad step. Felt pain in right knee.  Patient states overall knee pain is about the same.  Has not had any change in symptoms.  He states he has been trying to contact work comp insurance has not had any success.  Has not heard anything about PT, MRI or orthopedic consult being approved.    ROS    SOCHX: Works as a  at [employer]  FH: No pertinent family history to this problem.       Objective:     /62 (BP Location: Right arm, Patient Position: Sitting, BP Cuff Size: Adult long)   Pulse (!) 102   Temp 37.4 °C (99.3 °F) (Temporal)   Resp 16   Ht 1.829 m (6')   Wt 92.3 kg (203 lb 6.4 oz)   SpO2 98%   BMI 27.59 kg/m²     Constitutional: Patient is in no acute distress. Appears well-developed and well-nourished.   Cardiovascular: Normal rate.    Pulmonary/Chest: Effort normal. No respiratory distress.   Neurological: Patient is alert and oriented to person, place, and time.   Skin: Skin is warm and dry.   Psychiatric: Normal mood and affect. Behavior is normal.     Right knee: No gross deformity.  Area of pain over the medial and lateral joint lines.  Slight antalgic gait.     Assessment/Plan:       1. Sprain of right knee, unspecified ligament, subsequent encounter    Released to Restricted Duty FROM 8/16/2022 TO 9/15/2022       Referral to orthopedics, pending approval for the past month  Referral for MRI right knee, pending approval for the past 2 and half months  Referral for physical therapy, pending approval for the past 2 and half months  OTC ibuprofen/Tylenol as needed  Elevate and ice knee as needed   Continue knee brace for prolonged walking or going up and down stairs  Restricted duty  Follow-up 4 weeks if not seen by  orthopedics    Differential diagnosis, natural history, supportive care, and indications for immediate follow-up discussed.    Approximately 25 minutes was spent in preparing for visit, obtaining history, exam and evaluation, patient counseling/education and post visit documentation/orders.

## 2022-09-14 ENCOUNTER — OCCUPATIONAL MEDICINE (OUTPATIENT)
Dept: OCCUPATIONAL MEDICINE | Facility: CLINIC | Age: 32
End: 2022-09-14
Payer: COMMERCIAL

## 2022-09-14 VITALS
OXYGEN SATURATION: 97 % | DIASTOLIC BLOOD PRESSURE: 84 MMHG | WEIGHT: 210 LBS | HEART RATE: 90 BPM | RESPIRATION RATE: 18 BRPM | SYSTOLIC BLOOD PRESSURE: 136 MMHG | BODY MASS INDEX: 28.44 KG/M2 | HEIGHT: 72 IN

## 2022-09-14 DIAGNOSIS — S83.91XD SPRAIN OF RIGHT KNEE, UNSPECIFIED LIGAMENT, SUBSEQUENT ENCOUNTER: ICD-10-CM

## 2022-09-14 PROCEDURE — 99213 OFFICE O/P EST LOW 20 MIN: CPT | Performed by: PREVENTIVE MEDICINE

## 2022-09-14 NOTE — LETTER
[address],   [address] [place] [address]  Phone:  [phone] - Fax:  [phone]   Occupational Health [facility] Progress Report and Disability Certification  Date of Service: 9/14/2022   No Show:  No  Date / Time of Next Visit: 10/5/2022 @8:00am   Claim Information   Patient Name: Forest Melendez  Claim Number:     Employer:   [employer] Date of Injury: 5/19/2022     Insurer / TPA: [employer]  ID / SSN:     Occupation: Construction  Diagnosis: The encounter diagnosis was Sprain of right knee, unspecified ligament, subsequent encounter.    Medical Information   Related to Industrial Injury? 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 REGIS: he was going downstairs under a house and took a bad step. Felt pain in right knee.  Patient states that overall symptoms are about the same.  Continues to have pain over the knee.  He states he is also getting pain through the quadricep and feels that his loss significant amount of the mass of his quadricep muscle.  He states he will get shooting pain from the hip to the anterior proximal tibia along his quadriceps.  He characterizes this more as a nerve type pain.  He states he did receive approval for MRI of the knee but is still in the process of getting it scheduled.   Objective Findings: Right knee: No gross deformity.  Area of pain over the medial and lateral joint lines.  Slight antalgic gait.    Pre-Existing Condition(s):     Assessment:   Condition Same    Status: Additional Care Required  Permanent Disability:No    Plan:      Diagnostics:      Comments:  Referral to orthopedics, pending approval   Referral for MRI right knee, approved, pending scheduling  Referral for physical therapy, approved, provided information to schedule  OTC ibuprofen/Tylenol as needed  Elevate and ice knee as needed   Continue knee brace for prolonged walking or going up and down  stairs  Restricted duty  Follow-up 3 weeks, sooner if MRI performed sooner    Disability Information   Status: Released to Restricted Duty    From:  9/14/2022  Through: 10/5/2022 Restrictions are: Temporary   Physical Restrictions   Sitting:    Standing:  < or = to 2 hrs/day Stooping:    Bending:      Squatting:  < or = to 1 hr/day Walking:  < or = to 2 hrs/day Climbing:  < or = to 1 hr/day Pushing:      Pulling:    Other:    Reaching Above Shoulder (L):   Reaching Above Shoulder (R):       Reaching Below Shoulder (L):    Reaching Below Shoulder (R):      Not to exceed Weight Limits   Carrying(hrs):   Weight Limit(lb): < or = to 25 pounds Lifting(hrs):   Weight  Limit(lb): < or = to 25 pounds   Comments:      Repetitive Actions   Hands: i.e. Fine Manipulations from Grasping:     Feet: i.e. Operating Foot Controls:     Driving / Operate Machinery:     Health Care Provider’s Original or Electronic Signature  Jono Corona D.O. Health Care Provider’s Original or Electronic Signature    Forest Allen MD         Clinic Name / Location: [address],   [address] [place], [place] [address], [place] [address] Clinic Phone Number: Dept: [phone]   Appointment Time: 8:00 Am Visit Start Time: 8:35 AM   Check-In Time:  8:10 Am Visit Discharge Time:  852am   Original-Treating Physician or Chiropractor    Page 2-Insurer/TPA    Page 3-Employer    Page 4-Employee

## 2022-09-14 NOTE — PROGRESS NOTES
Subjective:     Forest Melendez is a 32 y.o. 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 REGIS: he was going downstairs under a house and took a bad step. Felt pain in right knee.  Patient states that overall symptoms are about the same.  Continues to have pain over the knee.  He states he is also getting pain through the quadricep and feels that his loss significant amount of the mass of his quadricep muscle.  He states he will get shooting pain from the hip to the anterior proximal tibia along his quadriceps.  He characterizes this more as a nerve type pain.  He states he did receive approval for MRI of the knee but is still in the process of getting it scheduled.    ROS         Objective:     /84   Pulse 90   Resp 18   Ht 1.829 m (6')   Wt 95.3 kg (210 lb)   SpO2 97%   BMI 28.48 kg/m²     Constitutional: Patient is in no acute distress. Appears well-developed and well-nourished.   Cardiovascular: Normal rate.    Pulmonary/Chest: Effort normal. No respiratory distress.   Neurological: Patient is alert and oriented to person, place, and time.   Skin: Skin is warm and dry.   Psychiatric: Normal mood and affect. Behavior is normal.     Right knee: No gross deformity.  Area of pain over the medial and lateral joint lines.  Slight antalgic gait.     Assessment/Plan:       1. Sprain of right knee, unspecified ligament, subsequent encounter    Released to Restricted Duty FROM 9/14/2022 TO 10/5/2022       Referral to orthopedics, pending approval   Referral for MRI right knee, approved, pending scheduling  Referral for physical therapy, approved, provided information to schedule  OTC ibuprofen/Tylenol as needed  Elevate and ice knee as needed   Continue knee brace for prolonged walking or going up and down stairs  Restricted duty  Follow-up 3 weeks, sooner if MRI performed sooner    Differential diagnosis, natural  history, supportive care, and indications for immediate follow-up discussed.    Approximately 25 minutes was spent in preparing for visit, obtaining history, exam and evaluation, patient counseling/education and post visit documentation/orders.

## 2022-11-15 ENCOUNTER — OCCUPATIONAL MEDICINE (OUTPATIENT)
Dept: OCCUPATIONAL MEDICINE | Facility: CLINIC | Age: 32
End: 2022-11-15
Payer: COMMERCIAL

## 2022-11-15 VITALS
BODY MASS INDEX: 28.44 KG/M2 | DIASTOLIC BLOOD PRESSURE: 84 MMHG | HEART RATE: 98 BPM | SYSTOLIC BLOOD PRESSURE: 126 MMHG | RESPIRATION RATE: 16 BRPM | HEIGHT: 72 IN | OXYGEN SATURATION: 97 % | WEIGHT: 210 LBS | TEMPERATURE: 98.3 F

## 2022-11-15 DIAGNOSIS — S83.91XD SPRAIN OF RIGHT KNEE, UNSPECIFIED LIGAMENT, SUBSEQUENT ENCOUNTER: ICD-10-CM

## 2022-11-15 PROCEDURE — 99213 OFFICE O/P EST LOW 20 MIN: CPT | Performed by: PREVENTIVE MEDICINE

## 2022-11-15 ASSESSMENT — ENCOUNTER SYMPTOMS
TINGLING: 0
SENSORY CHANGE: 0

## 2022-11-15 NOTE — LETTER
[address],   [address] [place] [address]  Phone:  [phone] - Fax:  [phone]   Occupational Health [facility] Progress Report and Disability Certification  Date of Service: 11/15/2022   No Show:  No  Date / Time of Next Visit: 12/15/2022@   Claim Information   Patient Name: Forest Melendez  Claim Number:     Employer:    Date of Injury: 5/19/2022     Insurer / TPA: [employer]  ID / SSN:     Occupation: Construction  Diagnosis: The encounter diagnosis was Sprain of right knee, unspecified ligament, subsequent encounter.    Medical Information   Related to Industrial Injury? 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 REGIS: he was going downstairs under a house and took a bad step. Felt pain in right knee.  Patient states that overall right knee pain is about the same.  He continues to work within the work restrictions.  He states he has not received any calls regarding the MRI or physical therapy.   Objective Findings: Right knee: No gross deformity.  Area of pain over the medial and lateral joint lines.  Slight antalgic gait.        Pre-Existing Condition(s):     Assessment:   Condition Same    Status: Additional Care Required  Permanent Disability:No    Plan:      Diagnostics:      Comments:  Referral to orthopedics, pending approval,   Referral for MRI right knee, approved, patient discussed with his case  and with MTI, [facility]TI should be receiving approval today to schedule the MRI referral to physical therapy, approved pending scheduling  Continue physical therapy  OTC ibuprofen/Tylenol as needed  Elevate and ice knee as needed   Continue knee brace for prolonged walking or going up and down stairs  Restricted duty  Follow-up 3 weeks, sooner if MRI performed sooner      Disability Information   Status: Released to Restricted Duty    From:  11/15/2022  Through: 12/15/2022 Restrictions are:  Temporary   Physical Restrictions   Sitting:    Standing:  < or = to 2 hrs/day Stooping:    Bending:      Squatting:  < or = to 1 hr/day Walking:  < or = to 2 hrs/day Climbing:  < or = to 1 hr/day Pushing:      Pulling:    Other:    Reaching Above Shoulder (L):   Reaching Above Shoulder (R):       Reaching Below Shoulder (L):    Reaching Below Shoulder (R):      Not to exceed Weight Limits   Carrying(hrs):   Weight Limit(lb): < or = to 25 pounds Lifting(hrs):   Weight  Limit(lb): < or = to 25 pounds   Comments:      Repetitive Actions   Hands: i.e. Fine Manipulations from Grasping:     Feet: i.e. Operating Foot Controls:     Driving / Operate Machinery:     Health Care Provider’s Original or Electronic Signature  Jono Corona D.O. Health Care Provider’s Original or Electronic Signature    Jono Corona DO MPH     Clinic Name / Location: [address] [address] Clinic Phone Number: Dept: [phone]   Appointment Time: 8:15 Am Visit Start Time: 8:15 AM   Check-In Time:  8:14 Am Visit Discharge Time:  8:46AM   Original-Treating Physician or Chiropractor    Page 2-Insurer/TPA    Page 3-Employer    Page 4-Employee

## 2022-11-15 NOTE — PROGRESS NOTES
Subjective:     Forest Melendez is a 32 y.o. 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 REGIS: he was going downstairs under a house and took a bad step. Felt pain in right knee.  Patient states that overall right knee pain is about the same.  He continues to work within the work restrictions.  He states he has not received any calls regarding the MRI or physical therapy.    Review of Systems   Neurological:  Negative for tingling and sensory change.     SOCH[employer]: Works as a  at [employer]  FH: No pertinent family history to this problem.       Objective:     /84   Pulse 98   Temp 36.8 °C (98.3 °F) (Temporal)   Resp 16   Ht 1.829 m (6')   Wt 95.3 kg (210 lb)   SpO2 97%   BMI 28.48 kg/m²     Constitutional: Patient is in no acute distress. Appears well-developed and well-nourished.   Cardiovascular: Normal rate.    Pulmonary/Chest: Effort normal. No respiratory distress.   Neurological: Patient is alert and oriented to person, place, and time.   Skin: Skin is warm and dry.   Psychiatric: Normal mood and affect. Behavior is normal.     Right knee: No gross deformity.  Area of pain over the medial and lateral joint lines.  Slight antalgic gait.         Assessment/Plan:       1. Sprain of right knee, unspecified ligament, subsequent encounter    Released to Restricted Duty FROM 11/15/2022 TO 12/15/2022       Referral to orthopedics, pending approval,   Referral for MRI right knee, approved, patient discussed with his case  and with MTI, [facility]TI should be receiving approval today to schedule the MRI referral to physical therapy, approved pending scheduling  Continue physical therapy  OTC ibuprofen/Tylenol as needed  Elevate and ice knee as needed   Continue knee brace for prolonged walking or going up and down stairs  Restricted duty  Follow-up 3 weeks, sooner if MRI performed  sooner      Differential diagnosis, natural history, supportive care, and indications for immediate follow-up discussed.    Approximately 25 minutes was spent in preparing for visit, obtaining history, exam and evaluation, patient counseling/education and post visit documentation/orders.

## 2023-02-10 ENCOUNTER — OCCUPATIONAL MEDICINE (OUTPATIENT)
Dept: OCCUPATIONAL MEDICINE | Facility: CLINIC | Age: 33
End: 2023-02-10
Payer: COMMERCIAL

## 2023-02-10 DIAGNOSIS — S83.91XD SPRAIN OF RIGHT KNEE, UNSPECIFIED LIGAMENT, SUBSEQUENT ENCOUNTER: ICD-10-CM

## 2023-02-10 PROCEDURE — 99442 PR PHYSICIAN TELEPHONE EVALUATION 11-20 MIN: CPT | Mod: GT | Performed by: PREVENTIVE MEDICINE

## 2023-02-10 NOTE — LETTER
[address],   [address] [place] [address]  Phone:  [phone] - Fax:  [phone]   Occupational Health [facility] Progress Report and Disability Certification  Date of Service: 2/10/2023   No Show:  No  Date / Time of Next Visit: 3/24/2023   Claim Information   Patient Name: Forest Melendez  Claim Number:     Employer:   [employer] Date of Injury: 5/19/2022     Insurer / TPA: [employer]  ID / SSN:     Occupation: Construction  Diagnosis: The encounter diagnosis was Sprain of right knee, unspecified ligament, subsequent encounter.    Medical Information   Related to Industrial Injury? 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 REGIS: he was going downstairs under a house and took a bad step. Felt pain in right knee.  Patient states that overall right knee pain is about the same.  He states he was able to get the MRI performed about 2 weeks ago.  He states while he is in there with the radiologist her tach told him that look like he had an MCL tear.   Objective Findings: MRI right knee: No meniscal tear.  No internal derangement.  Fissuring of the cartilage of the femoral trochlear groove.   Pre-Existing Condition(s):     Assessment:   Condition Same    Status: Additional Care Required  Permanent Disability:No    Plan:      Diagnostics:      Comments:  YesPatient concerned about what he was told after getting the MRI or possibly an MCL tear, but nothing reported on the report.  The referral to orthopedics has yet to be approved this was sent over 6 months ago.  Would recommend approval of this referral.  Resent referral.  Continue restricted duty  Follow-up 6 weeks    Disability Information   Status: Released to Restricted Duty    From:  2/10/2023  Through: 3/24/2023 Restrictions are: Temporary   Physical Restrictions   Sitting:    Standing:  < or = to 2 hrs/day Stooping:    Bending:      Squatting:  < or  = to 1 hr/day Walking:  < or = to 2 hrs/day Climbing:  < or = to 1 hr/day Pushing:      Pulling:    Other:    Reaching Above Shoulder (L):   Reaching Above Shoulder (R):       Reaching Below Shoulder (L):    Reaching Below Shoulder (R):      Not to exceed Weight Limits   Carrying(hrs):   Weight Limit(lb): < or = to 25 pounds Lifting(hrs):   Weight  Limit(lb): < or = to 25 pounds   Comments:      Repetitive Actions   Hands: i.e. Fine Manipulations from Grasping:     Feet: i.e. Operating Foot Controls:     Driving / Operate Machinery:     Health Care Provider’s Original or Electronic Signature  Jono Corona D.O. Health Care Provider’s Original or Electronic Signature    Jono Corona DO MPH     Clinic Name / Location: [address],   [address] [address] Clinic Phone Number: Dept: [phone]   Appointment Time: 2:00 Pm Visit Start Time: 2:16 PM   Check-In Time:  2:14 Pm Visit Discharge Time:  3:16 PM   Original-Treating Physician or Chiropractor    Page 2-Insurer/TPA    Page 3-Employer    Page 4-Employee

## 2023-02-10 NOTE — PROGRESS NOTES
Telephone Appointment Visit    This telephone visit was initiated by the patient and they verbally consented.    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 REGIS: he was going downstairs under a house and took a bad step. Felt pain in right knee.  Patient states that overall right knee pain is about the same.  He states he was able to get the MRI performed about 2 weeks ago.  He states while he is in there with the radiologist her tach told him that look like he had an MCL tear.    Labs / Images Reviewed:     MRI right knee: No meniscal tear.  No internal derangement.  Fissuring of the cartilage of the femoral trochlear groove.    Assessment and Plan:     1. Sprain of right knee, unspecified ligament, subsequent encounter    Patient concerned about what he was told after getting the MRI or possibly an MCL tear, but nothing reported on the report.  The referral to orthopedics has yet to be approved this was sent over 6 months ago.  Would recommend approval of this referral.  Resent referral.  Continue restricted duty  Follow-up 6 weeks    Follow-up: 6 weeks    Total Time Spent (mins): 12    Jono Corona D.O.

## 2023-03-24 ENCOUNTER — OCCUPATIONAL MEDICINE (OUTPATIENT)
Dept: OCCUPATIONAL MEDICINE | Facility: CLINIC | Age: 33
End: 2023-03-24
Payer: COMMERCIAL

## 2023-03-24 DIAGNOSIS — S83.91XD SPRAIN OF RIGHT KNEE, UNSPECIFIED LIGAMENT, SUBSEQUENT ENCOUNTER: ICD-10-CM

## 2023-03-24 PROCEDURE — 99441 PR PHYSICIAN TELEPHONE EVALUATION 5-10 MIN: CPT | Mod: 95 | Performed by: PREVENTIVE MEDICINE

## 2023-03-24 NOTE — LETTER
[address],   [address] [place] [address]  Phone:  [phone] - Fax:  [phone]   Occupational Health [facility] Progress Report and Disability Certification  Date of Service: 3/24/2023   No Show:  No  Date / Time of Next Visit: 5/5/2023 @3:00PM   Claim Information   Patient Name: Forest Melendez  Claim Number:     Employer:    Date of Injury: 5/19/2022     Insurer / TPA: [employer]  ID / SSN:     Occupation: Construction  Diagnosis: The encounter diagnosis was Sprain of right knee, unspecified ligament, subsequent encounter.    Medical Information   Related to Industrial Injury? 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 REGIS: he was going downstairs under a house and took a bad step. Felt pain in right knee.  Patient states that overall right knee pain is about the same.  Has not heard anything from the work comp insurance about referral to orthopedics being approved.  Continues to have pain over the anterior knee.  Patient would like someone besides the radiologist to review the images   Objective Findings: MRI right knee: No meniscal tear.  No internal derangement.  Fissuring of the cartilage of the femoral trochlear groove.   Pre-Existing Condition(s):     Assessment:   Condition Same    Status: Additional Care Required  Permanent Disability:No    Plan:      Diagnostics:      Comments:  Patient states he was told after getting the MRI that there was a meniscus or possibly an MCL tear, but nothing reported on the report.  Would like someone besides radiologist to review the images.  Referral to orthopedics pending  Continue restricted duty  Follow-up 6 weeks       Disability Information   Status: Released to Restricted Duty    From:  3/24/2023  Through: 5/5/2023 Restrictions are: Temporary   Physical Restrictions   Sitting:    Standing:  < or = to 2 hrs/day Stooping:    Bending:       Squatting:  < or = to 1 hr/day Walking:  < or = to 2 hrs/day Climbing:    Pushing:      Pulling:    Other:    Reaching Above Shoulder (L):   Reaching Above Shoulder (R):       Reaching Below Shoulder (L):    Reaching Below Shoulder (R):      Not to exceed Weight Limits   Carrying(hrs):   Weight Limit(lb): < or = to 25 pounds Lifting(hrs):   Weight  Limit(lb): < or = to 25 pounds   Comments:      Repetitive Actions   Hands: i.e. Fine Manipulations from Grasping:     Feet: i.e. Operating Foot Controls:     Driving / Operate Machinery:     Health Care Provider’s Original or Electronic Signature  Jono Corona D.O. Health Care Provider’s Original or Electronic Signature    Jono Corona DO MPH     Clinic Name / Location: [address],   [address] [place], [place] [address], [place] [address] Clinic Phone Number: Dept: [phone]   Appointment Time: 2:00 Pm Visit Start Time: 2:28 PM   Check-In Time:  2:27 Pm Visit Discharge Time:  2:56PM   Original-Treating Physician or Chiropractor    Page 2-Insurer/TPA    Page 3-Employer    Page 4-Employee

## 2023-03-24 NOTE — PROGRESS NOTES
Telephone Appointment Visit    This telephone visit was initiated by the patient and they verbally consented.    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 REGIS: he was going downstairs under a house and took a bad step. Felt pain in right knee.  Patient states that overall right knee pain is about the same.  Has not heard anything from the work comp insurance about referral to orthopedics being approved.  Continues to have pain over the anterior knee.  Patient would like someone besides the radiologist to review the images    Labs / Images Reviewed:   MRI right knee: No meniscal tear.  No internal derangement.  Fissuring of the cartilage of the femoral trochlear groove.    Assessment and Plan:     1. Sprain of right knee, unspecified ligament, subsequent encounter    Patient states he was told after getting the MRI that there was a meniscus or possibly an MCL tear, but nothing reported on the report.  Would like someone besides radiologist to review the images.  Referral to orthopedics pending  Continue restricted duty  Follow-up 6 weeks    Follow-up: 6 weeks    Total Time Spent (mins): 7 min    Jono Corona D.O.

## 2023-05-04 ENCOUNTER — TELEPHONE (OUTPATIENT)
Dept: OCCUPATIONAL MEDICINE | Facility: CLINIC | Age: 33
End: 2023-05-04
Payer: COMMERCIAL

## 2023-05-04 NOTE — TELEPHONE ENCOUNTER
Phone Number Called:Called Cindy landa [phone]       Call outcome: Left detailed message for patient. Informed to call back with any additional questions.    Message: LVM for  to see if she had a status of the Ortho referral for Josué.

## 2023-05-05 ENCOUNTER — OCCUPATIONAL MEDICINE (OUTPATIENT)
Dept: OCCUPATIONAL MEDICINE | Facility: CLINIC | Age: 33
End: 2023-05-05
Payer: COMMERCIAL

## 2023-05-05 DIAGNOSIS — S83.91XD SPRAIN OF RIGHT KNEE, UNSPECIFIED LIGAMENT, SUBSEQUENT ENCOUNTER: ICD-10-CM

## 2023-05-05 PROCEDURE — 99441 PR PHYSICIAN TELEPHONE EVALUATION 5-10 MIN: CPT | Mod: GT | Performed by: PREVENTIVE MEDICINE

## 2023-05-05 NOTE — PROGRESS NOTES
Telephone Appointment Visit    This telephone visit was initiated by the patient and they verbally consented.    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 REGIS: he was going downstairs under a house and took a bad step. Felt pain in right knee.      Patient states that overall right knee pain is about the same.  Pain is mild to moderate in nature for the most part.  Has not heard anything from the work comp insurance about the pending referral to orthopedics.     Labs / Images Reviewed:   MRI right knee: No meniscal tear.  No internal derangement.  Fissuring of the cartilage of the femoral trochlear groove    Assessment and Plan:     1. Sprain of right knee, unspecified ligament, subsequent encounter  Patient states he was told after getting the MRI that there was a meniscus or possibly an MCL tear, but nothing reported on the report.  Would like someone besides radiologist to review the images.  Referral to orthopedics pending  Continue restricted duty  Follow-up 6 weeks       Follow-up: 6 weeks    Total Time Spent (mins): 5    Jono Corona D.O.

## 2023-05-05 NOTE — LETTER
[address],   [address] [place] [address]  Phone:  [phone] - Fax:  [phone]   [facility] Progress Report and Disability Certification  Date of Service: 5/5/2023   No Show:  No  Date / Time of Next Visit: 6/23/2023, telephone   Claim Information   Patient Name: Forest Melendez  Claim Number:     Employer:    Date of Injury: 5/19/2022     Insurer / TPA: [employer]  ID / SSN:     Occupation: Construction  Diagnosis: The encounter diagnosis was Sprain of right knee, unspecified ligament, subsequent encounter.    Medical Information   Related to Industrial Injury? 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 REGIS: he was going downstairs under a house and took a bad step. Felt pain in right knee.      Patient states that overall right knee pain is about the same.  Pain is mild to moderate in nature for the most part.  Has not heard anything from the work comp insurance about the pending referral to orthopedics.      Objective Findings: MRI right knee: No meniscal tear.  No internal derangement.  Fissuring of the cartilage of the femoral trochlear groove   Pre-Existing Condition(s):     Assessment:   Condition Same    Status: Additional Care Required  Permanent Disability:No    Plan:      Diagnostics:      Comments:  Patient states he was told after getting the MRI that there was a meniscus or possibly an MCL tear, but nothing reported on the report.  Would like someone besides radiologist to review the images.  Referral to orthopedics pending  Continue restricted duty  Follow-up 6 weeks       Disability Information   Status: Released to Restricted Duty    From:  5/5/2023  Through: 6/23/2023 Restrictions are: Temporary   Physical Restrictions   Sitting:    Standing:    Stooping:    Bending:      Squatting:    Walking:    Climbing:    Pushing:      Pulling:    Other:    Reaching  Above Shoulder (L):   Reaching Above Shoulder (R):       Reaching Below Shoulder (L):    Reaching Below Shoulder (R):      Not to exceed Weight Limits   Carrying(hrs):   Weight Limit(lb):   Lifting(hrs):   Weight  Limit(lb):     Comments:      Repetitive Actions   Hands: i.e. Fine Manipulations from Grasping:     Feet: i.e. Operating Foot Controls:     Driving / Operate Machinery:     Health Care Provider’s Original or Electronic Signature  Jono Corona D.O. Health Care Provider’s Original or Electronic Signature    Jono Corona DO MPH     Clinic Name / Location: [address],   [address]  [place] [address] Clinic Phone Number: Dept: [phone]   Appointment Time: 3:00 Pm Visit Start Time: 3:23 PM   Check-In Time:  3:22 Pm Visit Discharge Time:  3:31 PM   Original-Treating Physician or Chiropractor    Page 2-Insurer/TPA    Page 3-Employer    Page 4-Employee

## 2023-06-23 ENCOUNTER — OCCUPATIONAL MEDICINE (OUTPATIENT)
Dept: OCCUPATIONAL MEDICINE | Facility: CLINIC | Age: 33
End: 2023-06-23
Payer: COMMERCIAL

## 2023-06-23 DIAGNOSIS — S83.91XD SPRAIN OF RIGHT KNEE, UNSPECIFIED LIGAMENT, SUBSEQUENT ENCOUNTER: ICD-10-CM

## 2023-06-23 PROCEDURE — 99441 PR PHYSICIAN TELEPHONE EVALUATION 5-10 MIN: CPT | Mod: 95 | Performed by: PREVENTIVE MEDICINE

## 2023-06-23 NOTE — PROGRESS NOTES
Telephone Appointment Visit    This telephone visit was initiated by the patient and they verbally consented.    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 REGIS: he was going downstairs under a house and took a bad step. Felt pain in right knee.       5/10/2023: Patient states that overall right knee pain is about the same.  Pain is mild to moderate in nature for the most part.  Has not heard anything from the work comp insurance about the pending referral to orthopedics.     6/23/2023: Patient states that he has had a little bit of improvement in symptoms.  He states pain is mostly mild.  However states he has not heard anything from the work comp insurance about getting appoint with orthopedics.    Labs / Images Reviewed:   MRI right knee: No meniscal tear.  No internal derangement.  Fissuring of the cartilage of the femoral trochlear groove    Assessment and Plan:     1. Sprain of right knee, unspecified ligament, subsequent encounter  Patient states he was told after getting the MRI that there was a meniscus or possibly an MCL tear, but nothing reported on the report.  Would like someone besides radiologist to review the images.  Referral to orthopedics, pending approval x4 months  Continue restricted duty  Follow-up 6 weeks    Follow-up: 6 weeks    Total Time Spent (mins): 7 minutes    Jono Corona D.O.

## 2023-06-23 NOTE — LETTER
[address],   [address] [place] [address]  Phone:  [phone] - Fax:  [phone]   Occupational Health [facility] Progress Report and Disability Certification  Date of Service: 6/23/2023   No Show:  No  Date / Time of Next Visit: 8/10/2023 @ 3:00 PM via telephone   Claim Information   Patient Name: Forest Melendez  Claim Number:     Employer:    Date of Injury: 5/19/2022     Insurer / TPA: [employer]  ID / SSN:     Occupation: Construction  Diagnosis: The encounter diagnosis was Sprain of right knee, unspecified ligament, subsequent encounter.    Medical Information   Related to Industrial Injury? 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 REGIS: he was going downstairs under a house and took a bad step. Felt pain in right knee.       5/10/2023: Patient states that overall right knee pain is about the same.  Pain is mild to moderate in nature for the most part.  Has not heard anything from the work comp insurance about the pending referral to orthopedics.     6/23/2023: Patient states that he has had a little bit of improvement in symptoms.  He states pain is mostly mild.  However states he has not heard anything from the work comp insurance about getting appoint with orthopedics.   Objective Findings: MRI right knee: No meniscal tear.  No internal derangement.  Fissuring of the cartilage of the femoral trochlear groove   Pre-Existing Condition(s):     Assessment:   Condition Same    Status: Additional Care Required  Permanent Disability:No    Plan:      Diagnostics:      Comments:  Patient states he was told after getting the MRI that there was a meniscus or possibly an MCL tear, but nothing reported on the report.  Would like someone besides radiologist to review the images.  Referral to orthopedics, pending approval x4 months  Continue restricted duty  Follow-up 6 weeks    Disability Information    Status: Released to Restricted Duty    From:  6/23/2023  Through: 8/10/2023 Restrictions are: Temporary   Physical Restrictions   Sitting:    Standing:  < or = to 4 hrs/day Stooping:    Bending:      Squatting:    Walking:  < or = to 4 hrs/day Climbing:    Pushing:      Pulling:    Other:    Reaching Above Shoulder (L):   Reaching Above Shoulder (R):       Reaching Below Shoulder (L):    Reaching Below Shoulder (R):      Not to exceed Weight Limits   Carrying(hrs):   Weight Limit(lb): < or = to 25 pounds Lifting(hrs):   Weight  Limit(lb): < or = to 25 pounds   Comments:      Repetitive Actions   Hands: i.e. Fine Manipulations from Grasping:     Feet: i.e. Operating Foot Controls:     Driving / Operate Machinery:     Health Care Provider’s Original or Electronic Signature  Jono Corona D.O. Health Care Provider’s Original or Electronic Signature    Jono Corona DO MPH     Clinic Name / Location: [address],   [address] [place] [address] [place] [address] Clinic Phone Number: Dept: [phone]   Appointment Time: 3:00 Pm Visit Start Time: 3:51 PM   Check-In Time:  3:26 Pm Visit Discharge Time:  4:01 PM   Original-Treating Physician or Chiropractor    Page 2-Insurer/TPA    Page 3-Employer    Page 4-Employee